# Patient Record
Sex: MALE | Race: WHITE | NOT HISPANIC OR LATINO | URBAN - METROPOLITAN AREA
[De-identification: names, ages, dates, MRNs, and addresses within clinical notes are randomized per-mention and may not be internally consistent; named-entity substitution may affect disease eponyms.]

---
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
## 2023-04-12 PROBLEM — L02.31 ABSCESS OF BUTTOCK, LEFT: Status: ACTIVE | Noted: 2023-04-12

## 2023-04-12 PROBLEM — S13.4XXA WHIPLASH: Status: ACTIVE | Noted: 2023-04-12

## 2023-04-12 PROBLEM — S99.921A RIGHT FOOT INJURY: Status: ACTIVE | Noted: 2023-04-12

## 2023-04-12 RX ORDER — NAPROXEN 500 MG/1
500 TABLET ORAL 2 TIMES DAILY
COMMUNITY
Start: 2021-05-03 | End: 2023-04-13 | Stop reason: ALTCHOICE

## 2023-04-12 RX ORDER — CYCLOBENZAPRINE HCL 10 MG
1 TABLET ORAL 3 TIMES DAILY PRN
COMMUNITY
Start: 2021-05-03 | End: 2023-04-13 | Stop reason: ALTCHOICE

## 2023-04-13 ENCOUNTER — OFFICE VISIT (OUTPATIENT)
Dept: PRIMARY CARE | Facility: CLINIC | Age: 43
End: 2023-04-13
Payer: COMMERCIAL

## 2023-04-13 VITALS
HEIGHT: 74 IN | BODY MASS INDEX: 21.05 KG/M2 | WEIGHT: 164 LBS | HEART RATE: 80 BPM | OXYGEN SATURATION: 98 % | SYSTOLIC BLOOD PRESSURE: 131 MMHG | DIASTOLIC BLOOD PRESSURE: 69 MMHG | TEMPERATURE: 97.1 F

## 2023-04-13 DIAGNOSIS — L02.92 RECURRENT BOILS: ICD-10-CM

## 2023-04-13 DIAGNOSIS — R05.9 COUGH IN ADULT: Primary | ICD-10-CM

## 2023-04-13 PROCEDURE — 1036F TOBACCO NON-USER: CPT | Performed by: STUDENT IN AN ORGANIZED HEALTH CARE EDUCATION/TRAINING PROGRAM

## 2023-04-13 PROCEDURE — 99212 OFFICE O/P EST SF 10 MIN: CPT | Performed by: STUDENT IN AN ORGANIZED HEALTH CARE EDUCATION/TRAINING PROGRAM

## 2023-04-13 SDOH — ECONOMIC STABILITY: FOOD INSECURITY: WITHIN THE PAST 12 MONTHS, THE FOOD YOU BOUGHT JUST DIDN'T LAST AND YOU DIDN'T HAVE MONEY TO GET MORE.: NEVER TRUE

## 2023-04-13 SDOH — ECONOMIC STABILITY: FOOD INSECURITY: WITHIN THE PAST 12 MONTHS, YOU WORRIED THAT YOUR FOOD WOULD RUN OUT BEFORE YOU GOT MONEY TO BUY MORE.: NEVER TRUE

## 2023-04-13 ASSESSMENT — ENCOUNTER SYMPTOMS
VOMITING: 0
FEVER: 0
CHILLS: 0
CONFUSION: 0
DIZZINESS: 0
FATIGUE: 0
COUGH: 1
CONSTIPATION: 0
HEADACHES: 0
WHEEZING: 0
PALPITATIONS: 0
COLOR CHANGE: 0
MUSCULOSKELETAL NEGATIVE: 1
DIARRHEA: 0
SHORTNESS OF BREATH: 0
NAUSEA: 0
UNEXPECTED WEIGHT CHANGE: 0
ABDOMINAL PAIN: 0

## 2023-04-13 ASSESSMENT — PATIENT HEALTH QUESTIONNAIRE - PHQ9
SUM OF ALL RESPONSES TO PHQ9 QUESTIONS 1 & 2: 0
1. LITTLE INTEREST OR PLEASURE IN DOING THINGS: NOT AT ALL
2. FEELING DOWN, DEPRESSED OR HOPELESS: NOT AT ALL

## 2023-04-13 ASSESSMENT — LIFESTYLE VARIABLES: HOW MANY STANDARD DRINKS CONTAINING ALCOHOL DO YOU HAVE ON A TYPICAL DAY: PATIENT DOES NOT DRINK

## 2023-04-13 NOTE — PROGRESS NOTES
"Subjective   Patient ID: Dakota Mccollum is a 42 y.o. male who presents for New Patient Visit (Former pt of Firo Cano), Cough (10 days ago became sick. Cough is lingering. Neg covid test.), Had terrible stomach issue in Nov (Is better but might want tests), and History of boils on buttocks and one on back.    HPI   He is here to [facility] and also with few complaints.     # Cough   - reports he had viral URI 10 days ago, getting better but still having lingering cough   - denies fever/chills, SOB, CP and other asso sx.   - reports cough is getting much better today    # Boils   - h/o boils on his back & buttocks   - reports he had recurrent boils over the last yr; initially started with COVID   - denies active boils at the moment     # Stomach issues   - had bad stomach issue sback in Nov/22; gradually got better   - reports his pain improved, appetite been normal.   - denies diarrhea, bld in the stool, constipation, N/V and others sx     Review of Systems   Constitutional:  Negative for chills, fatigue, fever and unexpected weight change.   HENT: Negative.     Respiratory:  Positive for cough. Negative for shortness of breath and wheezing.    Cardiovascular:  Negative for chest pain, palpitations and leg swelling.   Gastrointestinal:  Negative for abdominal pain, constipation, diarrhea, nausea and vomiting.   Musculoskeletal: Negative.    Skin:  Negative for color change and rash.   Neurological:  Negative for dizziness and headaches.   Psychiatric/Behavioral:  Negative for behavioral problems and confusion.        Objective   /69 (BP Location: Left arm, Patient Position: Sitting, BP Cuff Size: Small adult)   Pulse 80   Temp 36.2 °C (97.1 °F)   Ht 1.88 m (6' 2\")   Wt 74.4 kg (164 lb)   SpO2 98%   BMI 21.06 kg/m²     Physical Exam  Vitals and nursing note reviewed.   Constitutional:       Appearance: Normal appearance.   Eyes:      Extraocular Movements: Extraocular movements intact.      Pupils: " Pupils are equal, round, and reactive to light.   Cardiovascular:      Rate and Rhythm: Normal rate and regular rhythm.      Pulses: Normal pulses.      Heart sounds: Normal heart sounds.   Pulmonary:      Effort: Pulmonary effort is normal. No respiratory distress.      Breath sounds: Normal breath sounds.   Abdominal:      General: Abdomen is flat. Bowel sounds are normal.      Palpations: Abdomen is soft.   Musculoskeletal:         General: Normal range of motion.   Neurological:      General: No focal deficit present.      Mental Status: He is alert and oriented to person, place, and time.   Psychiatric:         Mood and Affect: Mood normal.         Behavior: Behavior normal.       Assessment/Plan   He is here to [facility] and also with few complaints. Appears that he had h/o recurrent boils in the last yr, no clear etiologies; cont to monitor for now; will obtain BW as a baseline. Also rec to monitor stomach issues for now as he has normal exam & no pain at all.   Also likely he had viral URI with cough; now his cough has improved and almost feels back to baseline. Cont OTC cough syrup, hydration; rec to let us know if starts having fever or cough worsens again. Otherwise clinically & vitally stable.   Problem List Items Addressed This Visit    None  Visit Diagnoses       Recurrent boils    -  Primary    Relevant Orders    Comprehensive metabolic panel    CBC and Auto Differential    Cough in adult        Relevant Orders    Comprehensive metabolic panel    CBC and Auto Differential           Henri Baires MD   [facility], Family Medicine